# Patient Record
Sex: MALE | Race: WHITE
[De-identification: names, ages, dates, MRNs, and addresses within clinical notes are randomized per-mention and may not be internally consistent; named-entity substitution may affect disease eponyms.]

---

## 2017-03-11 ENCOUNTER — HOSPITAL ENCOUNTER (EMERGENCY)
Dept: HOSPITAL 56 - MW.ED | Age: 22
Discharge: HOME | End: 2017-03-11
Payer: MEDICAID

## 2017-03-11 VITALS — DIASTOLIC BLOOD PRESSURE: 80 MMHG | SYSTOLIC BLOOD PRESSURE: 127 MMHG

## 2017-03-11 DIAGNOSIS — F32.9: ICD-10-CM

## 2017-03-11 DIAGNOSIS — Z79.899: ICD-10-CM

## 2017-03-11 DIAGNOSIS — Z98.890: ICD-10-CM

## 2017-03-11 DIAGNOSIS — Z88.5: ICD-10-CM

## 2017-03-11 DIAGNOSIS — F41.9: ICD-10-CM

## 2017-03-11 DIAGNOSIS — B34.9: Primary | ICD-10-CM

## 2017-03-11 DIAGNOSIS — R01.1: ICD-10-CM

## 2017-03-11 LAB
CHLORIDE SERPL-SCNC: 105 MMOL/L (ref 98–110)
SODIUM SERPL-SCNC: 141 MMOL/L (ref 136–146)

## 2017-03-11 PROCEDURE — 80053 COMPREHEN METABOLIC PANEL: CPT

## 2017-03-11 PROCEDURE — 87804 INFLUENZA ASSAY W/OPTIC: CPT

## 2017-03-11 PROCEDURE — G0480 DRUG TEST DEF 1-7 CLASSES: HCPCS

## 2017-03-11 PROCEDURE — 80305 DRUG TEST PRSMV DIR OPT OBS: CPT

## 2017-03-11 PROCEDURE — 99284 EMERGENCY DEPT VISIT MOD MDM: CPT

## 2017-03-11 PROCEDURE — 70450 CT HEAD/BRAIN W/O DYE: CPT

## 2017-03-11 PROCEDURE — 71020: CPT

## 2017-03-11 PROCEDURE — 85025 COMPLETE CBC W/AUTO DIFF WBC: CPT

## 2017-03-11 PROCEDURE — 36415 COLL VENOUS BLD VENIPUNCTURE: CPT

## 2017-03-11 NOTE — EDM.PDOC
ED HPI GENERAL MEDICAL PROBLEM





- General


Chief Complaint: General


Stated Complaint: FLU LIKE SYSMPTOMS


Time Seen by Provider: 03/11/17 21:40





- History of Present Illness


INITIAL COMMENTS - FREE TEXT/NARRATIVE: 





HISTORY AND PHYSICAL:





History of present illness:


Patient's 21-year-old male presents with concern of general body aches weakness 

he states he had some episodes of confusion he denies fever chills nausea 

vomiting he denies shortness of breath chest pain or other concern he does use 

marijuana he denies any other drug or alcohol abuse





Review of systems: 


As per history of present illness and below otherwise all systems reviewed and 

negative.





Past medical history: 


As per history of present illness and as reviewed below otherwise 

noncontributory.





Surgical history: 


As per history of present illness and as reviewed below otherwise 

noncontributory.





Social history: 


No reported history of drug or alcohol abuse.





Family history: 


As per history of present illness and as reviewed below otherwise 

noncontributory.





Physical exam:


HEENT: Atraumatic, normocephalic, pupils reactive, negative for conjunctival 

pallor or scleral icterus, mucous membranes moist, throat clear, neck supple, 

nontender, trachea midline.


Lungs: Clear to auscultation, breath sounds equal bilaterally, chest nontender.


Heart: S1S2, regular, negative for clicks, rubs, or JVD.


Abdomen: Soft, nondistended, nontender. Negative for masses or 

hepatosplenomegaly. Negative for costovertebral tenderness.


Pelvis: Stable nontender.


Genitourinary: Deferred.


Rectal: Deferred.


Extremities: Atraumatic, negative for cords or calf pain. Neurovascular 

unremarkable.


Neuro: Awake, alert, oriented. Cranial nerves II through XII unremarkable. 

Cerebellum unremarkable. Motor and sensory unremarkable throughout. Exam 

nonfocal.





Diagnostics:


CBC CMP UDS influenza screen chest x-ray CT brain





Therapeutics:


none





Impression: 


#1 probable viral syndrome





Definitive disposition and diagnosis as appropriate pending reevaluation and 

review of above.





- Related Data


 Allergies











Allergy/AdvReac Type Severity Reaction Status Date / Time


 


codeine Allergy  Hives Verified 03/11/17 21:39











Home Meds: 


 Home Meds





Escitalopram Oxalate [Lexapro] 30 mg PO 08/29/16 [History]


Topiramate [Topamax] 100 mg PO 08/29/16 [History]


buPROPion HCl [Wellbutrin Sr] 200 mg PO 08/29/16 [History]











Past Medical History





- Past Health History


Medical/Surgical History: Denies Medical/Surgical History


HEENT History: Reports: None


Cardiovascular History: Reports: Heart murmur, Other (see below)


Other Cardiovascular History: aortic stenosis


Respiratory History: Reports: None


Gastrointestinal History: Reports: None


Genitourinary History: Reports: None


Musculoskeletal History: Reports: None


Psychiatric History: Reports: Anxiety, Depression, Panic attack


Endocrine/Metabolic History: Reports: None


Hematologic History: Reports: None


Immunologic History: Reports: None


Oncologic (Cancer) History: Reports: None


Dermatologic History: Reports: None





- Infectious Disease History


Infectious Disease History: Reports: None





- Past Surgical History


HEENT Surgical History: Reports: Adenoidectomy, Tonsillectomy





Social & Family History





- Family History


Family Medical History: Noncontributory


HEENT: Reports: None


Cardiac: Reports: None


Respiratory: Reports: None


GI: Reports: None


: Reports: None


OBGYN: Reports: None


Musculoskeletal: Reports: None


Neurological: Reports: None


Endocrine/Metabolic: Reports: None


Hematologic: Reports: None


Immunologic: Reports: None


Dermatologic: Reports: None





- Tobacco Use


Smoking Status *Q: Never Smoker


Second Hand Smoke Exposure: No





- Caffeine Use


Caffeine Use: Reports: None





- Recreational Drug Use


Recreational Drug Use: No





ED ROS GENERAL





- Review of Systems


Review Of Systems: ROS reveals no pertinent complaints other than HPI.





ED EXAM, GENERAL





- Physical Exam


Exam: See Below (See dictation)





Course





- Vital Signs


Last Recorded V/S: 


 Last Vital Signs











Temp  36.5 C   03/11/17 21:39


 


Pulse  103 H  03/11/17 21:39


 


Resp  16   03/11/17 21:39


 


BP  133/82   03/11/17 21:39


 


Pulse Ox  99   03/11/17 21:39














- Orders/Labs/Meds


Orders: 


 Active Orders 24 hr











 Category Date Time Status


 


 Chest 2V [CR] Stat Exams  03/11/17 21:43 Taken


 


 Head wo Cont [CT] Stat Exams  03/11/17 21:43 Taken


 


 CBC WITH AUTO DIFF [HEME] Stat Lab  03/11/17 22:07 Results


 


 CMP [COMPREHENSIVE METABOLIC PN,CMP] [CHEM] Stat Lab  03/11/17 22:07 Received


 


 ETOH [ETHANOL BLOOD MEDICAL] [CHEM] Stat Lab  03/11/17 22:07 Received











Labs: 


 Laboratory Tests











  03/11/17 03/11/17 Range/Units





  21:45 22:07 


 


WBC   3.45 L  (4.0-11.0)  K/uL


 


RBC   4.53  (4.50-5.90)  M/uL


 


Hgb   14.4  (13.0-17.0)  g/dL


 


Hct   42.4  (38.0-50.0)  %


 


MCV   93.6  (80.0-98.0)  fL


 


MCH   31.8  (27.0-32.0)  pg


 


MCHC   34.0  (31.0-37.0)  g/dL


 


RDW Std Deviation   44.5  (28.0-62.0)  fl


 


RDW Coeff of Selvin   13  (11.0-15.0)  %


 


Plt Count   184  (150-400)  K/uL


 


MPV   10.00  (7.40-12.00)  fL


 


Add Manual Diff   YES  


 


Nucleated RBC %   0.0  /100WBC


 


Nucleated RBCs #   0  K/uL


 


Urine Opiates Screen  NEGATIVE   (NEGATIVE)  


 


Ur Oxycodone Screen  NEGATIVE   (NEGATIVE)  


 


Urine Methadone Screen  NEGATIVE   (NEGATIVE)  


 


Ur Barbiturates Screen  NEGATIVE   (NEGATIVE)  


 


Ur Phencyclidine Scrn  NEGATIVE   (NEGATIVE)  


 


Ur Amphetamine Screen  NEGATIVE   (NEGATIVE)  


 


U Methamphetamines Scrn  NEGATIVE   (NEGATIVE)  


 


U Benzodiazepines Scrn  NEGATIVE   (NEGATIVE)  


 


U Cocaine Metab Screen  NEGATIVE   (NEGATIVE)  


 


U Marijuana (THC) Screen  POSITIVE   (NEGATIVE)  














Departure





- Departure


Time of Disposition: 22:51


Disposition: Home, Self-Care 01


Condition: good


Clinical Impression: 


 Viral syndrome





Forms:  ED Department Discharge


Additional Instructions: 


The following information is given to patients seen in the emergency department 

who are being discharged to home. This information is to outline your options 

for follow-up care. We provide all patients seen in our emergency department 

with a follow-up referral.





The need for follow-up, as well as the timing and circumstances, are variable 

depending upon the specifics of your emergency department visit.





If you don't have a primary care physician on staff, we will provide you with a 

referral. We always advise you to contact your personal physician following an 

emergency department visit to inform them of the circumstance of the visit and 

for follow-up with them and/or the need for any referrals to a consulting 

specialist.





The emergency department will also refer you to a specialist when appropriate. 

This referral assures that you have the opportunity for followup care with a 

specialist. All of these measure are taken in an effort to provide you with 

optimal care, which includes your followup.





Under all circumstances we always encourage you to contact your private 

physician who remains a resource for coordinating  your care. When calling for 

followup care, please make the office aware that this follow-up is from your 

recent emergency room visit. If for any reason you are refused follow-up, 

please contact the [facility] emergency department at [phone] 

and asked to speak to the emergency department charge nurse.














[facility]


Primary Care


[address] [place] [address]


Phone: [phone]


Fax: [phone]




















Stop marijuana follow up primary medical doctor/primary care above as discussed 

24-48 hours return as needed as discussed





- My Orders


Last 24 Hours: 


My Active Orders





03/11/17 21:43


Chest 2V [CR] Stat 


Head wo Cont [CT] Stat 





03/11/17 22:07


CBC WITH AUTO DIFF [HEME] Stat 


CMP [COMPREHENSIVE METABOLIC PN,CMP] [CHEM] Stat 


ETOH [ETHANOL BLOOD MEDICAL] [CHEM] Stat 














- Assessment/Plan


Last 24 Hours: 


My Active Orders





03/11/17 21:43


Chest 2V [CR] Stat 


Head wo Cont [CT] Stat 





03/11/17 22:07


CBC WITH AUTO DIFF [HEME] Stat 


CMP [COMPREHENSIVE METABOLIC PN,CMP] [CHEM] Stat 


ETOH [ETHANOL BLOOD MEDICAL] [CHEM] Stat

## 2017-03-13 NOTE — CR
EXAM DATE: 17



PATIENT'S AGE: 21



Patient: BERT BAILEY



Facility: [place], [place]

Patient ID: [id number]

Site Patient ID: [id number].

Site Accession #: [id number]

: 1995

Study: XRay Chest [id number]/11/2017 9:59:24 PM

Ordering Physician: John Zamora



Final Report: 

INDICATION:

Cough.



Technique:

PA and lateral chest x-ray.



Findings:

Heart size normal. Lungs clear without infiltrate or consolidation. Air-fluid 
level within a mildly gas distended upper small bowel loop nonspecific. Chest 
otherwise negative.



Dictated by Feliciano Echols MD @ Mar 11 2017 10:01PM

(Electronic Signature)



Report Signed by Proxy and Original Signed Document filed in the

Medical Record.
MTDD

## 2017-03-13 NOTE — CT
EXAM DATE: 17



PATIENT'S AGE: 21



Patient: BERT BAILEY



Facility: [place], [place]

Patient ID: [id number]

Site Patient ID: [id number].

Site Accession #: [id number]

: 1995

Study: CT Head [id number]/11/2017 9:57:21 PM

Ordering Physician: John Zamora



Final Report: 

INDICATION:

Confusion.



Technique:

CT head without IV contrast.



Findings:

Mild mucosal thickening involving bilateral ethmoidal sinuses. No intracranial 
hemorrhage, edema, or mass-effect. Remainder negative. 



Impression:

1. No acute intracranial disease.

2. Minimal mucosal thickening and bilateral ethmoidal sinuses.



Dictated by Feliciano Echols MD @ Mar 11 2017 10:01PM

(Electronic Signature)



Report Signed by Proxy and Original Signed Document filed in the

Medical Record.
SARA

## 2017-06-03 ENCOUNTER — HOSPITAL ENCOUNTER (EMERGENCY)
Dept: HOSPITAL 56 - MW.ED | Age: 22
Discharge: HOME | End: 2017-06-03
Payer: MEDICAID

## 2017-06-03 VITALS — DIASTOLIC BLOOD PRESSURE: 66 MMHG | SYSTOLIC BLOOD PRESSURE: 129 MMHG

## 2017-06-03 DIAGNOSIS — F32.9: ICD-10-CM

## 2017-06-03 DIAGNOSIS — M10.9: Primary | ICD-10-CM

## 2017-06-03 DIAGNOSIS — Z98.890: ICD-10-CM

## 2017-06-03 DIAGNOSIS — Z88.5: ICD-10-CM

## 2017-06-03 DIAGNOSIS — F41.0: ICD-10-CM

## 2017-06-03 NOTE — EDM.PDOC
ED HPI GENERAL MEDICAL PROBLEM





- General


Chief Complaint: Lower Extremity Injury/Pain


Stated Complaint: RIGHT ANKLE PAIN


Time Seen by Provider: 06/03/17 17:29





- History of Present Illness


INITIAL COMMENTS - FREE TEXT/NARRATIVE: 


HISTORY AND PHYSICAL:





History of present illness:


21-year-old white male history of gout that concern acute gouty flareup to his 

right ankle he states occurs intermittently without any clear precipitating 

factors he has used colchicine without improvement recently





Review of systems: 


As per history of present illness and below otherwise all systems reviewed and 

negative.





Past medical history: 


As per history of present illness and as reviewed below otherwise 

noncontributory.





Surgical history: 


As per history of present illness and as reviewed below otherwise 

noncontributory.





Social history: 


No reported history of drug or alcohol abuse.





Family history: 


As per history of present illness and as reviewed below otherwise 

noncontributory.





Physical exam:


HEENT: Atraumatic, normocephalic, pupils reactive, negative for conjunctival 

pallor or scleral icterus, mucous membranes moist, throat clear, neck supple, 

nontender, trachea midline.


Lungs: Clear to auscultation, breath sounds equal bilaterally, chest nontender.


Heart: S1S2, regular, negative for clicks, rubs, or JVD.


Abdomen: Soft, nondistended, nontender. Negative for masses or 

hepatosplenomegaly. Negative for costovertebral tenderness.


Pelvis: Stable nontender.


Genitourinary: Deferred.


Rectal: Deferred.


Extremities: Mild tenderness with movement his right ankle no erythema no point 

tenderness crepitation CNS neurovascular exam is unremarkable


Neuro: Awake, alert, oriented. Cranial nerves II through XII unremarkable. 

Cerebellum unremarkable. Motor and sensory unremarkable throughout. Exam 

nonfocal.





Diagnostics:


None





Therapeutics:


None





Impression: 


#1 acute gouty arthritis





Definitive disposition and diagnosis as appropriate pending reevaluation and 

review of above.





- Related Data


 Allergies











Allergy/AdvReac Type Severity Reaction Status Date / Time


 


codeine Allergy  Hives Verified 03/11/17 21:39











Home Meds: 


 Home Meds





Escitalopram Oxalate [Lexapro] 30 mg PO 08/29/16 [History]


Topiramate [Topamax] 100 mg PO 08/29/16 [History]


buPROPion HCl [Wellbutrin Sr] 200 mg PO 08/29/16 [History]











Past Medical History





- Past Health History


Medical/Surgical History: Denies Medical/Surgical History


HEENT History: Reports: None


Cardiovascular History: Reports: Heart Murmur, Other (See Below)


Other Cardiovascular History: aortic stenosis


Respiratory History: Reports: None


Gastrointestinal History: Reports: None


Genitourinary History: Reports: None


Musculoskeletal History: Reports: None


Psychiatric History: Reports: Anxiety, Depression, Panic Attack


Endocrine/Metabolic History: Reports: None


Hematologic History: Reports: None


Immunologic History: Reports: None


Oncologic (Cancer) History: Reports: None


Dermatologic History: Reports: None





- Infectious Disease History


Infectious Disease History: Reports: None





- Past Surgical History


HEENT Surgical History: Reports: Adenoidectomy, Tonsillectomy





Social & Family History





- Family History


Family Medical History: Noncontributory


HEENT: Reports: None


Cardiac: Reports: None


Respiratory: Reports: None


GI: Reports: None


: Reports: None


OBGYN: Reports: None


Musculoskeletal: Reports: None


Neurological: Reports: None


Endocrine/Metabolic: Reports: None


Hematologic: Reports: None


Immunologic: Reports: None


Dermatologic: Reports: None





- Tobacco Use


Smoking Status *Q: Never Smoker


Second Hand Smoke Exposure: No





- Caffeine Use


Caffeine Use: Reports: None





- Recreational Drug Use


Recreational Drug Use: No


Recreational Drug Type: Reports: Marijuana/Hashish





Review of Systems





- Review of Systems


Review Of Systems: ROS reveals no pertinent complaints other than HPI.





ED EXAM, GENERAL





- Physical Exam


Exam: See Below (See dictated)





Course





- Orders/Labs/Meds


Orders: 





 Active Orders 24 hr











 Category Date Time Status


 


 Ankle Min 3V Rt [CR] Stat Exams  06/03/17 17:32 Ordered














Departure





- Departure


Time of Disposition: 17:37


Disposition: Home, Self-Care 01


Condition: good


Clinical Impression: 


 Gouty arthritis








- Discharge Information


Forms:  ED Department Discharge


Additional Instructions: 


The following information is given to patients seen in the emergency department 

who are being discharged to home. This information is to outline your options 

for follow-up care. We provide all patients seen in our emergency department 

with a follow-up referral.





The need for follow-up, as well as the timing and circumstances, are variable 

depending upon the specifics of your emergency department visit.





If you don't have a primary care physician on staff, we will provide you with a 

referral. We always advise you to contact your personal physician following an 

emergency department visit to inform them of the circumstance of the visit and 

for follow-up with them and/or the need for any referrals to a consulting 

specialist.





The emergency department will also refer you to a specialist when appropriate. 

This referral assures that you have the opportunity for followup care with a 

specialist. All of these measure are taken in an effort to provide you with 

optimal care, which includes your followup.





Under all circumstances we always encourage you to contact your private 

physician who remains a resource for coordinating  your care. When calling for 

followup care, please make the office aware that this follow-up is from your 

recent emergency room visit. If for any reason you are refused follow-up, 

please contact the [facility] emergency department at [phone] 

and asked to speak to the emergency department charge nurse.























[facility]


Specialty Care - Orthopedic Clinic


[address], [address]


[place] [address]


Phone: [phone]


Fax: [phone]














Indomethacin as prescribed crutches as directed call schedule appointment with 

orthopedic surgery above return as needed as discussed





- My Orders


Last 24 Hours: 





My Active Orders





06/03/17 17:32


Ankle Min 3V Rt [CR] Stat 














- Assessment/Plan


Last 24 Hours: 





My Active Orders





06/03/17 17:32


Ankle Min 3V Rt [CR] Stat

## 2017-06-29 ENCOUNTER — HOSPITAL ENCOUNTER (EMERGENCY)
Dept: HOSPITAL 56 - MW.ED | Age: 22
Discharge: LEFT BEFORE DISCHARGE | End: 2017-06-29
Payer: MEDICAID

## 2017-06-29 DIAGNOSIS — Z53.21: Primary | ICD-10-CM

## 2017-10-21 ENCOUNTER — HOSPITAL ENCOUNTER (EMERGENCY)
Dept: HOSPITAL 56 - MW.ED | Age: 22
Discharge: HOME | End: 2017-10-21
Payer: MEDICAID

## 2017-10-21 VITALS — DIASTOLIC BLOOD PRESSURE: 92 MMHG | SYSTOLIC BLOOD PRESSURE: 127 MMHG

## 2017-10-21 DIAGNOSIS — Z88.5: ICD-10-CM

## 2017-10-21 DIAGNOSIS — M10.9: Primary | ICD-10-CM

## 2017-10-21 DIAGNOSIS — Z79.899: ICD-10-CM

## 2017-10-21 PROCEDURE — 96372 THER/PROPH/DIAG INJ SC/IM: CPT

## 2017-10-21 PROCEDURE — 99283 EMERGENCY DEPT VISIT LOW MDM: CPT

## 2017-10-21 NOTE — EDM.PDOC
ED HPI GENERAL MEDICAL PROBLEM





- General


Chief Complaint: Lower Extremity Injury/Pain


Stated Complaint: RIGHT FOOT PAIN


Time Seen by Provider: 10/21/17 12:20


Source of Information: Reports: Patient


History Limitations: Reports: No Limitations





- History of Present Illness


INITIAL COMMENTS - FREE TEXT/NARRATIVE: 





History of present illness:


[22-year-old male presenting with complaints of acute gouty pain in right foot. 

Patient has a known history of gout and has received colchicine as well as 

indomethacin in the past and a sterile desiring medication now secondary to 

flare.]





Review of systems: 


As per history of present illness and below otherwise all systems reviewed and 

negative.





Past medical history: 


As per history of present illness and as reviewed below otherwise 

noncontributory.





Surgical history: 


As per history of present illness and as reviewed below otherwise 

noncontributory.





Social history: 


No reported history of drug or alcohol abuse.





Family history: 


As per history of present illness and as reviewed below otherwise 

noncontributory.





Physical exam:


HEENT: Atraumatic, normocephalic, pupils reactive, negative for conjunctival 

pallor or scleral icterus, mucous membranes moist, throat clear, neck supple, 

nontender, trachea midline.


Lungs: Clear to auscultation, breath sounds equal bilaterally, chest nontender.


Heart: S1S2, regular, negative for clicks, rubs, or JVD.


Abdomen: Soft, nondistended, nontender. Negative for masses or 

hepatosplenomegaly. Negative for costovertebral tenderness.


Pelvis: Stable nontender.


Genitourinary: Deferred.


Rectal: Deferred.


Extremities: Right foot noted to be slightly erythematous and edematous and 

painful to touch primarily at the great toe but radiating into the dorsal 

aspect of the foot, otherwise CNS exam unremarkable.


Neuro: Awake, alert, oriented. Cranial nerves II through XII unremarkable. 

Cerebellum unremarkable. Motor and sensory unremarkable throughout. Exam 

nonfocal.





Diagnostics:


[]





Therapeutics:


[]





Impression: 


[#1 gouty flare]





Plan:


[Indomethacin , follow-up with PCP]





Definitive disposition and diagnosis as appropriate pending reevaluation and 

review of above.





  ** right foot


Pain Score (Numeric/FACES): 10





- Related Data


 Allergies











Allergy/AdvReac Type Severity Reaction Status Date / Time


 


codeine Allergy  Hives Verified 10/21/17 12:32











Home Meds: 


 Home Meds





Topiramate [Topamax] 100 mg PO ASDIRECTED PRN 08/29/16 [History]


Colchicine [Colcrys] 0.6 mg PO DAILY 06/03/17 [History]











Past Medical History





- Past Health History


Medical/Surgical History: Denies Medical/Surgical History


HEENT History: Reports: None


Cardiovascular History: Reports: Heart Murmur, Other (See Below)


Other Cardiovascular History: aortic stenosis


Respiratory History: Reports: None


Gastrointestinal History: Reports: None


Genitourinary History: Reports: None


Musculoskeletal History: Reports: Gout


Psychiatric History: Reports: Anxiety, Depression, Panic Attack


Endocrine/Metabolic History: Reports: None


Hematologic History: Reports: None


Immunologic History: Reports: None


Oncologic (Cancer) History: Reports: None


Dermatologic History: Reports: None





- Infectious Disease History


Infectious Disease History: Reports: None





- Past Surgical History


HEENT Surgical History: Reports: Adenoidectomy, Tonsillectomy





Social & Family History





- Family History


Family Medical History: Noncontributory


HEENT: Reports: None


Cardiac: Reports: None


Respiratory: Reports: None


GI: Reports: None


: Reports: None


OBGYN: Reports: None


Musculoskeletal: Reports: None


Neurological: Reports: None


Endocrine/Metabolic: Reports: None


Hematologic: Reports: None


Immunologic: Reports: None


Dermatologic: Reports: None





- Tobacco Use


Smoking Status *Q: Never Smoker


Second Hand Smoke Exposure: No





- Caffeine Use


Caffeine Use: Reports: None





- Recreational Drug Use


Recreational Drug Use: No


Drug Use in Last 12 Months: Yes


Recreational Drug Type: Reports: Marijuana/Hashish


Recreational Drug Use Frequency: Daily





Review of Systems





- Review of Systems


Review Of Systems: See Below (See history of present illness)





ED EXAM, GENERAL





- Physical Exam


Exam: See Below (History of present illness)





Course





- Vital Signs


Last Recorded V/S: 


 Last Vital Signs











Temp  36.7 C   10/21/17 12:17


 


Pulse  78   10/21/17 12:17


 


Resp  18   10/21/17 12:17


 


BP  127/92 H  10/21/17 12:17


 


Pulse Ox  98   10/21/17 12:17














Departure





- Departure


Time of Disposition: 12:46


Disposition: Home, Self-Care 01


Condition: Good


Clinical Impression: 


 Gouty arthritis








- Discharge Information


Referrals: 


Ofe Mayorga MD [Primary Care Provider] - 


Forms:  ED Department Discharge


Additional Instructions: 


The following information is given to patients seen in the emergency department 

who are being discharged to home. This information is to outline your options 

for follow-up care. We provide all patients seen in our emergency department 

with a follow-up referral.





The need for follow-up, as well as the timing and circumstances, are variable 

depending upon the specifics of your emergency department visit.





If you don't have a primary care physician on staff, we will provide you with a 

referral. We always advise you to contact your personal physician following an 

emergency department visit to inform them of the circumstance of the visit and 

for follow-up with them and/or the need for any referrals to a consulting 

specialist.





The emergency department will also refer you to a specialist when appropriate. 

This referral assures that you have the opportunity for follow-up care with a 

specialist. All of these measure are taken in an effort to provide you with 

optimal care, which includes your follow-up.





Under all circumstances we always encourage you to contact your private 

physician who remains a resource for coordinating your care. When calling for 

follow-up care, please make the office aware that this follow-up is from your 

recent emergency room visit. If for any reason you are refused follow-up, 

please contact the [facility] Emergency 

Department at [phone] and asked to speak to the emergency department 

charge nurse.





Take medication as directed








Return to ER as needed as discussed





Follow-up with PCP 1-2 days








[facility]


Primary Care


[address] [place] [id number]


Phone: [phone]


Fax: [phone]

## 2020-09-20 NOTE — EDM.PDOC
ED HPI GENERAL MEDICAL PROBLEM





- General


Chief Complaint: General


Stated Complaint: LIGHTHEADED


Time Seen by Provider: 09/20/20 20:21


Source of Information: Reports: Patient


History Limitations: Reports: No Limitations





- History of Present Illness


INITIAL COMMENTS - FREE TEXT/NARRATIVE: 


HISTORY AND PHYSICAL:





History of present illness:


Patient is a 24-year-old male who presents to the ED today with concern of 

lightheaded and dizziness that occurred 1 hour prior to arrival to the ED.  

Patient states that he has had episodes of this in the past but only occurred 

when he was smoking marijuana.  Patient states he used to smoke marijuana 

chronically but stopped smoking 8 months ago due to these dizzy episodes he 

would get after smoking.  Patient states that he feels dizzy and lightheaded and

"like he would pass out "but states he has not passed out.  Patient states he 

has a history of congenital aortic stenosis but states he has not had any issues

with this since he was born.  Patient states he is going to establish care with 

a cardiologist here in [place] and has not seen a cardiologist in a few years.

 Patient states he feels as if the room is spinning and this makes him feel 

nauseous.  Patient states he has had a sore and stiff neck that feels like a 

"spasm" and states that he did take 1 pill of indomethacin earlier today.  

Patient states he has indomethacin available to him as he has had gout in the 

past.  Patient denies any head injury or trauma. Patient denies fever, chills, 

chest pain, shortness of breath, or cough. Denies headache, change in vision, 

syncope. Denies vomiting, abdominal pain, diarrhea, constipation, or dysuria. 

Has not noted any blood in urine or stool. Patient has been eating and drinking 

appropriately.





Review of systems: 


As per history of present illness and below otherwise all systems reviewed and 

negative.





Past medical history: 


As per history of present illness and as reviewed below otherwise 

noncontributory.





Surgical history: 


As per history of present illness and as reviewed below otherwise 

noncontributory.





Social history: 


See social history for further information





Family history: 


As per history of present illness and as reviewed below otherwise 

noncontributory.





Physical exam:


General: Patient is alert, oriented, and in no acute distress. Patient sitting 

comfortably on exam table.


HEENT: Atraumatic, normocephalic, pupils equal and reactive bilaterally, 

negative for conjunctival pallor or scleral icterus, mucous membranes moist, TMs

normal bilaterally, throat clear, neck supple, nontender, trachea midline. No 

drooling or trismus noted. No meningeal signs. No hot potato voice noted. 


Lungs: Clear to auscultation, breath sounds equal bilaterally, chest nontender.


Heart: S1S2, regular rate and rhythm without overt murmur


Abdomen: Soft, nondistended, nontender. Negative for masses or 

hepatosplenomegaly. Negative for costovertebral tenderness.


Pelvis: Stable nontender.


Genitourinary: Deferred.


Rectal: Deferred.


Skin: Intact, warm, dry. No lesions or rashes noted.


Extremities: Patient does have pain with range of motion of flexion of the 

cervical spine but able to rotate and extend without pain.  Patient does have 

pain with palpation of the right-sided trapezius muscle of the cervical spine.  

Patient has full range of motion of the thoracic and lumbar spine without pain 

or difficulty.  Kernig and Brudzinski sign are negative.  Patient able to 

ambulate into the ED without difficulty.  Otherwise, atraumatic, negative for 

cords or calf pain. Neurovascular unremarkable.


Neuro: Awake, alert, oriented. Cranial nerves II through XII unremarkable. 

Cerebellum unremarkable. Motor and sensory unremarkable throughout. Exam 

nonfocal.





Notes:


HEART score 0.


Discussed importance for follow-up with primary care provider.


Voices understanding and is agreeable to plan of care. Denies any further 

questions or concerns at this time.





Diagnostics:


CBC, CMP, UA, EKG, urine drug screen, chest x-ray, troponin, head CT





Therapeutics:


Saline, Zofran, meclizine





Prescription:


None





Impression: 


Dizziness, improved


Neck pain





Plan:


1.  Encourage small improvements into fluid prevent dehydration.  You can 

alternate ibuprofen and Tylenol as directed for pain and discomfort.


2.  Follow-up with a primary care provider as discussed.  Return to the ED as 

needed and as discussed.





Definitive disposition and diagnosis as appropriate pending reevaluation and 

review of above.








- Related Data


                                    Allergies











Allergy/AdvReac Type Severity Reaction Status Date / Time


 


codeine Allergy  Hives Verified 10/21/17 12:32











Home Meds: 


                                    Home Meds





. [No Known Home Meds]  09/20/20 [History]











Past Medical History





- Past Health History


Medical/Surgical History: Denies Medical/Surgical History


HEENT History: Reports: None


Cardiovascular History: Reports: Heart Murmur, Other (See Below)


Other Cardiovascular History: aortic stenosis


Respiratory History: Reports: None


Gastrointestinal History: Reports: None


Genitourinary History: Reports: None


Musculoskeletal History: Reports: Gout


Psychiatric History: Reports: Anxiety, Depression, Panic Attack


Endocrine/Metabolic History: Reports: None


Hematologic History: Reports: None


Immunologic History: Reports: None


Oncologic (Cancer) History: Reports: None


Dermatologic History: Reports: None





- Infectious Disease History


Infectious Disease History: Reports: None





- Past Surgical History


HEENT Surgical History: Reports: Adenoidectomy, Tonsillectomy





Social & Family History





- Family History


Family Medical History: Noncontributory


HEENT: Reports: None


Cardiac: Reports: None


Respiratory: Reports: None


GI: Reports: None


: Reports: None


OBGYN: Reports: None


Musculoskeletal: Reports: None


Neurological: Reports: None


Endocrine/Metabolic: Reports: None


Hematologic: Reports: None


Immunologic: Reports: None


Dermatologic: Reports: None





- Tobacco Use


Smoking Status *Q: Never Smoker


Second Hand Smoke Exposure: No





- Caffeine Use


Caffeine Use: Reports: None





- Recreational Drug Use


Recreational Drug Use: Yes


Drug Use in Last 12 Months: Yes


Recreational Drug Type: Reports: Marijuana/Hashish


Recreational Drug Use Frequency: Socially





ED ROS GENERAL





- Review of Systems


Review Of Systems: Comprehensive ROS is negative, except as noted in HPI.





ED EXAM, GENERAL





- Physical Exam


Exam: See Below (See dictation)





Course





- Vital Signs


Last Recorded V/S: 


                                Last Vital Signs











Temp  97.8 F   09/20/20 20:19


 


Pulse  78   09/20/20 20:19


 


Resp  14   09/20/20 20:19


 


BP  131/75   09/20/20 20:19


 


Pulse Ox  100   09/20/20 20:19














- Orders/Labs/Meds


Orders: 


                               Active Orders 24 hr











 Category Date Time Status


 


 Cardiac Monitoring [RC] .AS DIRECTED Care  09/20/20 20:44 Active


 


 EKG Documentation Completion [RC] STAT Care  09/20/20 20:21 Active











Labs: 


                                Laboratory Tests











  09/20/20 09/20/20 09/20/20 Range/Units





  20:15 20:15 21:35 


 


WBC  5.34    (4.0-11.0)  K/uL


 


RBC  4.64    (4.50-5.90)  M/uL


 


Hgb  14.9    (13.0-17.0)  g/dL


 


Hct  43.6    (38.0-50.0)  %


 


MCV  94.0    (80.0-98.0)  fL


 


MCH  32.1 H    (27.0-32.0)  pg


 


MCHC  34.2    (31.0-37.0)  g/dL


 


RDW Std Deviation  43.3    (28.0-62.0)  fl


 


RDW Coeff of Selvin  13    (11.0-15.0)  %


 


Plt Count  236    (150-400)  K/uL


 


MPV  9.70    (7.40-12.00)  fL


 


Neut % (Auto)  44.7 L    (48.0-80.0)  %


 


Lymph % (Auto)  47.9 H    (16.0-40.0)  %


 


Mono % (Auto)  6.6    (0.0-15.0)  %


 


Eos % (Auto)  0.6    (0.0-7.0)  %


 


Baso % (Auto)  0.2    (0.0-1.5)  %


 


Neut # (Auto)  2.4    (1.4-5.7)  K/uL


 


Lymph # (Auto)  2.6 H    (0.6-2.4)  K/uL


 


Mono # (Auto)  0.4    (0.0-0.8)  K/uL


 


Eos # (Auto)  0.0    (0.0-0.7)  K/uL


 


Baso # (Auto)  0.0    (0.0-0.1)  K/uL


 


Nucleated RBC %  0.0    /100WBC


 


Nucleated RBCs #  0    K/uL


 


Sodium   141   (136-148)  mmol/L


 


Potassium   3.9   (3.5-5.1)  mmol/L


 


Chloride   103   ()  mmol/L


 


Carbon Dioxide   27.1   (21.0-32.0)  mmol/L


 


BUN   11   (7.0-18.0)  mg/dL


 


Creatinine   1.3   (0.8-1.3)  mg/dL


 


Est Cr Clr Drug Dosing   95.34   mL/min


 


Estimated GFR (MDRD)   > 60.0   ml/min


 


Glucose   128 H   ()  mg/dL


 


Calcium   8.8   (8.5-10.1)  mg/dL


 


Total Bilirubin   0.6   (0.2-1.0)  mg/dL


 


AST   17   (15-37)  IU/L


 


ALT   28   (14-63)  IU/L


 


Alkaline Phosphatase   49   ()  U/L


 


Troponin I   < 0.050   (0.000-0.056)  ng/mL


 


Total Protein   7.1   (6.4-8.2)  g/dL


 


Albumin   4.1   (3.4-5.0)  g/dL


 


Globulin   3.0   (2.6-4.0)  g/dL


 


Albumin/Globulin Ratio   1.4   (0.9-1.6)  


 


Urine Color    YELLOW  


 


Urine Appearance    CLEAR  


 


Urine pH    7.5  (5.0-8.0)  


 


Ur Specific Gravity    1.020  (1.001-1.035)  


 


Urine Protein    NEGATIVE  (NEGATIVE)  mg/dL


 


Urine Glucose (UA)    NEGATIVE  (NEGATIVE)  mg/dL


 


Urine Ketones    NEGATIVE  (NEGATIVE)  mg/dL


 


Urine Occult Blood    NEGATIVE  (NEGATIVE)  


 


Urine Nitrite    NEGATIVE  (NEGATIVE)  


 


Urine Bilirubin    NEGATIVE  (NEGATIVE)  


 


Urine Urobilinogen    0.2  (<2.0)  EU/dL


 


Ur Leukocyte Esterase    NEGATIVE  (NEGATIVE)  


 


Urine Opiates Screen     (NEGATIVE)  


 


Ur Oxycodone Screen     (NEGATIVE)  


 


Urine Methadone Screen     (NEGATIVE)  


 


Ur Barbiturates Screen     (NEGATIVE)  


 


Ur Phencyclidine Scrn     (NEGATIVE)  


 


Ur Amphetamine Screen     (NEGATIVE)  


 


U Methamphetamines Scrn     (NEGATIVE)  


 


U Benzodiazepines Scrn     (NEGATIVE)  


 


U Cocaine Metab Screen     (NEGATIVE)  


 


U Marijuana (THC) Screen     (NEGATIVE)  














  09/20/20 Range/Units





  21:35 


 


WBC   (4.0-11.0)  K/uL


 


RBC   (4.50-5.90)  M/uL


 


Hgb   (13.0-17.0)  g/dL


 


Hct   (38.0-50.0)  %


 


MCV   (80.0-98.0)  fL


 


MCH   (27.0-32.0)  pg


 


MCHC   (31.0-37.0)  g/dL


 


RDW Std Deviation   (28.0-62.0)  fl


 


RDW Coeff of Selvin   (11.0-15.0)  %


 


Plt Count   (150-400)  K/uL


 


MPV   (7.40-12.00)  fL


 


Neut % (Auto)   (48.0-80.0)  %


 


Lymph % (Auto)   (16.0-40.0)  %


 


Mono % (Auto)   (0.0-15.0)  %


 


Eos % (Auto)   (0.0-7.0)  %


 


Baso % (Auto)   (0.0-1.5)  %


 


Neut # (Auto)   (1.4-5.7)  K/uL


 


Lymph # (Auto)   (0.6-2.4)  K/uL


 


Mono # (Auto)   (0.0-0.8)  K/uL


 


Eos # (Auto)   (0.0-0.7)  K/uL


 


Baso # (Auto)   (0.0-0.1)  K/uL


 


Nucleated RBC %   /100WBC


 


Nucleated RBCs #   K/uL


 


Sodium   (136-148)  mmol/L


 


Potassium   (3.5-5.1)  mmol/L


 


Chloride   ()  mmol/L


 


Carbon Dioxide   (21.0-32.0)  mmol/L


 


BUN   (7.0-18.0)  mg/dL


 


Creatinine   (0.8-1.3)  mg/dL


 


Est Cr Clr Drug Dosing   mL/min


 


Estimated GFR (MDRD)   ml/min


 


Glucose   ()  mg/dL


 


Calcium   (8.5-10.1)  mg/dL


 


Total Bilirubin   (0.2-1.0)  mg/dL


 


AST   (15-37)  IU/L


 


ALT   (14-63)  IU/L


 


Alkaline Phosphatase   ()  U/L


 


Troponin I   (0.000-0.056)  ng/mL


 


Total Protein   (6.4-8.2)  g/dL


 


Albumin   (3.4-5.0)  g/dL


 


Globulin   (2.6-4.0)  g/dL


 


Albumin/Globulin Ratio   (0.9-1.6)  


 


Urine Color   


 


Urine Appearance   


 


Urine pH   (5.0-8.0)  


 


Ur Specific Gravity   (1.001-1.035)  


 


Urine Protein   (NEGATIVE)  mg/dL


 


Urine Glucose (UA)   (NEGATIVE)  mg/dL


 


Urine Ketones   (NEGATIVE)  mg/dL


 


Urine Occult Blood   (NEGATIVE)  


 


Urine Nitrite   (NEGATIVE)  


 


Urine Bilirubin   (NEGATIVE)  


 


Urine Urobilinogen   (<2.0)  EU/dL


 


Ur Leukocyte Esterase   (NEGATIVE)  


 


Urine Opiates Screen  NEGATIVE  (NEGATIVE)  


 


Ur Oxycodone Screen  NEGATIVE  (NEGATIVE)  


 


Urine Methadone Screen  NEGATIVE  (NEGATIVE)  


 


Ur Barbiturates Screen  POSITIVE  (NEGATIVE)  


 


Ur Phencyclidine Scrn  NEGATIVE  (NEGATIVE)  


 


Ur Amphetamine Screen  NEGATIVE  (NEGATIVE)  


 


U Methamphetamines Scrn  NEGATIVE  (NEGATIVE)  


 


U Benzodiazepines Scrn  NEGATIVE  (NEGATIVE)  


 


U Cocaine Metab Screen  NEGATIVE  (NEGATIVE)  


 


U Marijuana (THC) Screen  NEGATIVE  (NEGATIVE)  











Meds: 


Medications














Discontinued Medications














Generic Name Dose Route Start Last Admin





  Trade Name Roseann  PRN Reason Stop Dose Admin


 


Sodium Chloride  1,000 mls @ 999 mls/hr  09/20/20 21:00  09/20/20 21:14





  Normal Saline  IV  09/20/20 22:00  999 mls/hr





  STAT ONE   Administration


 


Meclizine HCl  25 mg  09/20/20 21:00  09/20/20 21:13





  Antivert  PO  09/20/20 21:01  25 mg





  ONETIME ONE   Administration


 


Ondansetron HCl  4 mg  09/20/20 21:00  09/20/20 21:13





  Zofran  IVPUSH  09/20/20 21:01  4 mg





  ONETIME ONE   Administration














Departure





- Departure


Time of Disposition: 21:55


Disposition: Home, Self-Care 01


Clinical Impression: 


 Dizziness, Neck pain








- Discharge Information


Referrals: 


PCP,None [Primary Care Provider] - 


Forms:  ED Department Discharge


Additional Instructions: 


The following information is given to patients seen in the emergency department 

who are being discharged to home. This information is to outline your options 

for follow-up care. We provide all patients seen in our emergency department 

with a follow-up referral.





The need for follow-up, as well as the timing and circumstances, are variable 

depending upon the specifics of your emergency department visit.





If you don't have a primary care physician on staff, we will provide you with a 

referral. We always advise you to contact your personal physician following an 

emergency department visit to inform them of the circumstance of the visit and 

for follow-up with them and/or the need for any referrals to a consulting 

specialist.





The emergency department will also refer you to a specialist when appropriate. 

This referral assures that you have the opportunity for follow-up care with a 

specialist. All of these measure are taken in an effort to provide you with 

optimal care, which includes your follow-up.





Under all circumstances we always encourage you to contact your private 

physician who remains a resource for coordinating your care. When calling for 

follow-up care, please make the office aware that this follow-up is from your 

recent emergency room visit. If for any reason you are refused follow-up, please

contact the [facility] Emergency Department

at [phone] and asked to speak to the emergency department charge nurse.





[facility]


Primary Care


[address] [place] [address]


Phone: [phone]


Fax: [phone]





[facility]


[address] [place] [address]


Phone: [phone]


Fax: [phone]





1.  Encourage small improvements into fluid prevent dehydration.  You can al

ternate ibuprofen and Tylenol as directed for pain and discomfort.


2.  Follow-up with a primary care provider as discussed.  Return to the ED as 

needed and as discussed.





 











Sepsis Event Note (ED)





- Evaluation


Sepsis Screening Result: No Definite Risk





- Focused Exam


Vital Signs: 


                                   Vital Signs











  Temp Pulse Resp BP Pulse Ox


 


 09/20/20 20:19  97.8 F  78  14  131/75  100














- My Orders


Last 24 Hours: 


My Active Orders





09/20/20 20:21


EKG Documentation Completion [RC] STAT 





09/20/20 20:44


Cardiac Monitoring [RC] .AS DIRECTED 














- Assessment/Plan


Last 24 Hours: 


My Active Orders





09/20/20 20:21


EKG Documentation Completion [RC] STAT 





09/20/20 20:44


Cardiac Monitoring [RC] .AS DIRECTED

## 2020-09-20 NOTE — CT
Head CT

 

Technique: Multiple axial sections through the brain were obtained.  

Intravenous contrast was not utilized.

 

Comparison: Prior MRI brain of 07/16/20.

 

Findings: Ventricles along with basal cisterns and sulci over the 

convexities are within normal limits for the patient's age.  No 

abnormal parenchymal densities are seen.  No evidence of intracranial 

hemorrhage.  No midline shift or mass-effect is appreciated.

 

Bone window settings were reviewed.  Visualized sinuses show nothing 

acute.  Visualized mastoid sinuses show nothing acute.  No acute 

calvarial finding is appreciated.

 

Impression:

1.  Nothing acute is identified on noncontrast head CT exam.

 

Diagnostic code #1

 

This report was dictated in MDT

## 2020-09-20 NOTE — CR
Chest: Portable view of the chest was obtained.

 

Comparison: Prior chest x-ray of 03/11/17.

 

Heart size and mediastinum are normal.  Lungs are clear with no acute 

parenchymal change.  Bony structures are grossly intact.

 

Impression:

1.  Nothing acute is seen on portable chest x-ray.

 

Diagnostic code #1

 

This report was dictated in MDT